# Patient Record
Sex: FEMALE
[De-identification: names, ages, dates, MRNs, and addresses within clinical notes are randomized per-mention and may not be internally consistent; named-entity substitution may affect disease eponyms.]

---
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
## 2022-09-20 ENCOUNTER — OFFICE (OUTPATIENT)
Dept: URBAN - METROPOLITAN AREA CLINIC 6 | Facility: CLINIC | Age: 22
Setting detail: OPHTHALMOLOGY
End: 2022-09-20
Payer: COMMERCIAL

## 2022-09-20 DIAGNOSIS — H43.393: ICD-10-CM

## 2022-09-20 DIAGNOSIS — H52.13: ICD-10-CM

## 2022-09-20 PROBLEM — H52.7 REFRACTIVE ERROR: Status: ACTIVE | Noted: 2022-09-20

## 2022-09-20 PROCEDURE — 92004 COMPRE OPH EXAM NEW PT 1/>: CPT | Performed by: OPHTHALMOLOGY

## 2022-09-20 PROCEDURE — 92015 DETERMINE REFRACTIVE STATE: CPT | Performed by: OPHTHALMOLOGY

## 2022-09-20 ASSESSMENT — REFRACTION_MANIFEST
OS_VA1: 20/20
OS_AXIS: 175
OD_CYLINDER: SPHERE
OD_VA1: 20/20
OS_VA1: 20/20
OS_CYLINDER: -0.75
OD_VA1: 20/20
OS_SPHERE: -1.00
OD_CYLINDER: SPHERE
OD_AXIS: 0
OS_SPHERE: -0.50
OD_SPHERE: -0.75
OD_SPHERE: -1.00
OS_AXIS: 180
OS_CYLINDER: -0.75

## 2022-09-20 ASSESSMENT — REFRACTION_AUTOREFRACTION
OD_SPHERE: -0.75
OD_CYLINDER: SPHERE
OS_CYLINDER: -0.75
OD_CYLINDER: -0.25
OS_AXIS: 180
OD_AXIS: 180
OS_SPHERE: -0.50
OS_SPHERE: -1.00
OS_AXIS: 176
OS_CYLINDER: -0.75
OD_SPHERE: -1.00
OD_AXIS: 0

## 2022-09-20 ASSESSMENT — SPHEQUIV_DERIVED
OD_SPHEQUIV: -0.875
OS_SPHEQUIV: -1.375
OS_SPHEQUIV: -0.875
OS_SPHEQUIV: -0.875
OS_SPHEQUIV: -1.375

## 2022-09-20 ASSESSMENT — AXIALLENGTH_DERIVED
OS_AL: 24.5806
OS_AL: 24.3709
OS_AL: 24.3709
OS_AL: 24.5806
OD_AL: 24.2733

## 2022-09-20 ASSESSMENT — VISUAL ACUITY
OS_BCVA: 20/25+3
OD_BCVA: 20/25-2

## 2022-09-20 ASSESSMENT — KERATOMETRY
OS_AXISANGLE_DEGREES: 093
OD_K2POWER_DIOPTERS: 43.00
OD_K1POWER_DIOPTERS: 42.25
OD_AXISANGLE_DEGREES: 084
OS_K1POWER_DIOPTERS: 42.00
OS_K2POWER_DIOPTERS: 42.75

## 2022-09-20 ASSESSMENT — CONFRONTATIONAL VISUAL FIELD TEST (CVF)
OS_FINDINGS: FULL
OD_FINDINGS: FULL

## 2022-09-20 ASSESSMENT — TONOMETRY
OD_IOP_MMHG: 18
OS_IOP_MMHG: 21

## 2023-03-29 ENCOUNTER — APPOINTMENT (OUTPATIENT)
Dept: OBGYN | Facility: CLINIC | Age: 23
End: 2023-03-29

## 2023-04-03 ENCOUNTER — APPOINTMENT (OUTPATIENT)
Dept: OBGYN | Facility: CLINIC | Age: 23
End: 2023-04-03
Payer: MEDICAID

## 2023-04-03 ENCOUNTER — TRANSCRIPTION ENCOUNTER (OUTPATIENT)
Age: 23
End: 2023-04-03

## 2023-04-03 VITALS
DIASTOLIC BLOOD PRESSURE: 88 MMHG | BODY MASS INDEX: 44.98 KG/M2 | HEIGHT: 65 IN | WEIGHT: 270 LBS | SYSTOLIC BLOOD PRESSURE: 133 MMHG | HEART RATE: 90 BPM

## 2023-04-03 DIAGNOSIS — U07.1 COVID-19: ICD-10-CM

## 2023-04-03 DIAGNOSIS — Z00.00 ENCOUNTER FOR GENERAL ADULT MEDICAL EXAMINATION W/OUT ABNORMAL FINDINGS: ICD-10-CM

## 2023-04-03 DIAGNOSIS — I10 ESSENTIAL (PRIMARY) HYPERTENSION: ICD-10-CM

## 2023-04-03 PROCEDURE — 99385 PREV VISIT NEW AGE 18-39: CPT

## 2023-04-03 NOTE — PHYSICAL EXAM
[Chaperone Present] : A chaperone was present in the examining room during all aspects of the physical examination [Appropriately responsive] : appropriately responsive [Alert] : alert [No Acute Distress] : no acute distress [No Lymphadenopathy] : no lymphadenopathy [Regular Rate Rhythm] : regular rate rhythm [No Murmurs] : no murmurs [Clear to Auscultation B/L] : clear to auscultation bilaterally [Soft] : soft [Non-tender] : non-tender [Non-distended] : non-distended [No HSM] : No HSM [No Lesions] : no lesions [No Mass] : no mass [Oriented x3] : oriented x3 [No Masses] : no breast masses were palpable [Labia Majora] : normal [Labia Minora] : normal [Discharge] : a  ~M vaginal discharge was present [Scant] : scant [Clear] : clear [Thin] : thin [Normal] : normal [Anteversion] : anteverted [Uterine Adnexae] : normal [FreeTextEntry6] : No masses, nontender, no skin changes, no nipple discharge, no adenopathy. [Foul Smelling] : not foul smelling [Tenderness] : nontender [Mass ___ cm] : no uterine mass was palpated [FreeTextEntry5] : No CMT

## 2023-04-03 NOTE — HISTORY OF PRESENT ILLNESS
[FreeTextEntry1] : Patient a 22-year-old  0 last menstrual period \par Patient presents for initial visit, annual, complaining of vaginal discharge in the past 2 to 3 months

## 2023-04-03 NOTE — PLAN
[FreeTextEntry1] : Patient is a 22-year-old  0 last menstrual period 2023\par Patient presents for initial visit complaining of vaginal discharge for the past 2 to 3 months\par Physical exam reveals a well-developed well-nourished female no apparent distress morbidly obese BMI 45\par Heart regular rhythm and rate, lungs clear, breast no mass nontender no skin change or nipple discharge no adenopathy abdomen soft nontender no organomegaly\par Pelvic exam shows normal female external genitalia, vagina no lesions, evidence of a thin scant watery discharge cervix appropriate size nontender no cervical motion tenderness Pap smear performed allowing vaginal cultures\par Uterus anteverted small mobile nontender, adnexa no masses nontender but pelvic exam was very difficult secondary to patient's body habitus\par As stated Pap smear and vaginal cultures performed\par We will await culture results prior to initiating treatment\par Discussed option of possible birth control use Depo-Provera,, since patient is hypertensive\par Discussed weight loss advised as such\par Patient patient stands\par Patient will be contacted with the results of the cultures for further treatment\par \par \par Past medical history,,, hypertension,,COVID\par Past surgical history,,, patient denies\par Allergies,,, patient denies\par Medications,,, labetalol 100 mg twice a day\par Past OB history,,, patient denies\par Past GYN history,,, medication 11, interval 28, duration 07 days, patient denies history of birth control pill use or history of PID or STDs\par Tobacco use,,, patient denies\par Alcohol use,,, social use\par Drug use,,, patient denies\par Family history,,, mother alive hypertension,, father alive with hypertension and diabetes

## 2023-04-06 LAB
A VAGINAE DNA VAG QL NAA+PROBE: NORMAL
BVAB2 DNA VAG QL NAA+PROBE: NORMAL
C KRUSEI DNA VAG QL NAA+PROBE: NEGATIVE
C TRACH RRNA SPEC QL NAA+PROBE: NEGATIVE
C TRACH RRNA SPEC QL NAA+PROBE: NOT DETECTED
MEGA1 DNA VAG QL NAA+PROBE: NORMAL
N GONORRHOEA RRNA SPEC QL NAA+PROBE: NEGATIVE
N GONORRHOEA RRNA SPEC QL NAA+PROBE: NOT DETECTED
SOURCE TP AMPLIFICATION: NORMAL
T VAGINALIS RRNA SPEC QL NAA+PROBE: NEGATIVE

## 2023-11-03 ENCOUNTER — OFFICE (OUTPATIENT)
Dept: URBAN - METROPOLITAN AREA CLINIC 116 | Facility: CLINIC | Age: 23
Setting detail: OPHTHALMOLOGY
End: 2023-11-03
Payer: COMMERCIAL

## 2023-11-03 DIAGNOSIS — H52.222: ICD-10-CM

## 2023-11-03 DIAGNOSIS — H52.12: ICD-10-CM

## 2023-11-03 DIAGNOSIS — H52.11: ICD-10-CM

## 2023-11-03 PROCEDURE — 92014 COMPRE OPH EXAM EST PT 1/>: CPT | Performed by: OPTOMETRIST

## 2023-11-03 PROCEDURE — 92310 CONTACT LENS FITTING OU: CPT | Performed by: OPTOMETRIST

## 2023-11-03 ASSESSMENT — CONFRONTATIONAL VISUAL FIELD TEST (CVF)
OD_FINDINGS: FULL
OS_FINDINGS: FULL

## 2023-12-01 ENCOUNTER — OFFICE (OUTPATIENT)
Dept: URBAN - METROPOLITAN AREA CLINIC 116 | Facility: CLINIC | Age: 23
Setting detail: OPHTHALMOLOGY
End: 2023-12-01

## 2023-12-01 DIAGNOSIS — H52.13: ICD-10-CM

## 2023-12-01 DIAGNOSIS — H52.222: ICD-10-CM

## 2023-12-01 PROCEDURE — N/C NO CHARGE: Performed by: OPTOMETRIST

## 2023-12-01 ASSESSMENT — REFRACTION_AUTOREFRACTION
OS_CYLINDER: -0.75
OD_SPHERE: -0.75
OS_SPHERE: -0.50
OD_SPHERE: PLANO
OD_AXIS: 005
OS_AXIS: 180
OD_CYLINDER: -0.50
OD_CYLINDER: -0.25
OS_CYLINDER: -0.75
OD_SPHERE: -0.75
OD_AXIS: 000
OD_AXIS: 180
OS_SPHERE: -0.50
OS_AXIS: 180
OS_CYLINDER: -0.25
OS_SPHERE: -0.25
OS_SPHERE: -0.75
OS_AXIS: 105
OD_SPHERE: -1.25
OS_CYLINDER: -0.75
OD_CYLINDER: -0.25
OS_AXIS: 180
OD_AXIS: 180
OD_CYLINDER: -0.25

## 2023-12-01 ASSESSMENT — REFRACTION_CURRENTRX
OS_OVR_VA: 20/
OD_CYLINDER: PLANO
OS_CYLINDER: -0.75
OS_SPHERE: -0.50
OS_AXIS: 175
OD_CYLINDER: PLANO
OS_CYLINDER: -0.75
OD_OVR_VA: 20/
OS_AXIS: 175
OS_SPHERE: -0.50
OD_SPHERE: -0.75
OD_SPHERE: -0.75
OS_OVR_VA: 20/
OD_OVR_VA: 20/

## 2023-12-01 ASSESSMENT — AXIALLENGTH_DERIVED
OD_AL: 24.432
OD_AL: 24.2248
OS_AL: 24.4693
OS_AL: 24.6807
OS_AL: 24.5746
OS_AL: 24.4693
OS_AL: 24.5746

## 2023-12-01 ASSESSMENT — KERATOMETRY
OD_K1POWER_DIOPTERS: 42.50
OS_AXISANGLE_DEGREES: 095
OS_K1POWER_DIOPTERS: 41.50
OD_AXISANGLE_DEGREES: 085
OD_K2POWER_DIOPTERS: 43.00
OS_K2POWER_DIOPTERS: 42.75

## 2023-12-01 ASSESSMENT — REFRACTION_MANIFEST
OD_CYLINDER: SPHERE
OD_SPHERE: -0.75
OS_SPHERE: -1.00
OS_SPHERE: -0.50
OS_VA1: 20/20
OD_VA1: 20/20
OD_SPHERE: -1.00
OS_VA1: 20/20
OS_CYLINDER: -0.75
OS_SPHERE: -0.50
OS_AXIS: 175
OD_CYLINDER: SPH
OS_CYLINDER: -0.75
OD_VA1: 20/20
OS_AXIS: 180
OS_CYLINDER: -0.75
OS_CYLINDER: -0.75
OD_CYLINDER: SPHERE
OS_CYLINDER: -0.75
OD_SPHERE: -1.00
OS_SPHERE: -0.75
OS_AXIS: 175
OS_VA1: 20/20
OD_SPHERE: -1.00
OS_AXIS: 180
OS_AXIS: 180
OD_SPHERE: -0.75
OD_AXIS: 0
OD_CYLINDER: SPHERE
OS_VA1: 20/20
OS_SPHERE: -0.75
OS_CYLINDER: -0.75
OD_SPHERE: -1.00
OD_AXIS: 0
OD_CYLINDER: SPHERE
OD_VA1: 20/20
OD_VA1: 20/20
OD_CYLINDER: SPH
OS_SPHERE: -1.00
OS_AXIS: 180

## 2023-12-01 ASSESSMENT — SPHEQUIV_DERIVED
OS_SPHEQUIV: -1.375
OD_SPHEQUIV: -1.375
OS_SPHEQUIV: -1.125
OS_SPHEQUIV: -1.375
OS_SPHEQUIV: -0.375
OS_SPHEQUIV: -0.875
OS_SPHEQUIV: -1.125
OS_SPHEQUIV: -0.875
OS_SPHEQUIV: -0.875
OS_SPHEQUIV: -1.125
OD_SPHEQUIV: -0.875
OD_SPHEQUIV: -0.875
OS_SPHEQUIV: -0.875

## 2023-12-01 ASSESSMENT — CONFRONTATIONAL VISUAL FIELD TEST (CVF)
OD_FINDINGS: FULL
OS_FINDINGS: FULL

## 2025-06-23 PROBLEM — Z11.3 ENCOUNTER FOR SCREENING EXAMINATION FOR SEXUALLY TRANSMITTED INFECTION: Status: ACTIVE | Noted: 2025-06-23

## 2025-06-23 PROBLEM — Z12.4 CERVICAL CANCER SCREENING: Status: ACTIVE | Noted: 2025-06-23

## 2025-06-30 ENCOUNTER — APPOINTMENT (OUTPATIENT)
Dept: OBGYN | Facility: CLINIC | Age: 25
End: 2025-06-30
Payer: COMMERCIAL

## 2025-06-30 ENCOUNTER — LABORATORY RESULT (OUTPATIENT)
Age: 25
End: 2025-06-30

## 2025-06-30 ENCOUNTER — RESULT CHARGE (OUTPATIENT)
Age: 25
End: 2025-06-30

## 2025-06-30 VITALS
SYSTOLIC BLOOD PRESSURE: 133 MMHG | DIASTOLIC BLOOD PRESSURE: 93 MMHG | HEART RATE: 86 BPM | BODY MASS INDEX: 43.82 KG/M2 | WEIGHT: 263 LBS | HEIGHT: 65 IN

## 2025-06-30 PROBLEM — R82.90 ABNORMAL URINE FINDING: Status: ACTIVE | Noted: 2025-06-30

## 2025-06-30 PROBLEM — Z01.419 ENCOUNTER FOR ANNUAL ROUTINE GYNECOLOGICAL EXAMINATION: Status: ACTIVE | Noted: 2025-06-23

## 2025-06-30 LAB
BILIRUB UR QL STRIP: NEGATIVE
CLARITY UR: CLEAR
COLLECTION METHOD: NORMAL
GLUCOSE UR-MCNC: NEGATIVE
HCG UR QL: 0 EU/DL
HEMOGLOBIN: 12.5
HGB UR QL STRIP.AUTO: NORMAL
KETONES UR-MCNC: NEGATIVE
LEUKOCYTE ESTERASE UR QL STRIP: NORMAL
NITRITE UR QL STRIP: NEGATIVE
PH UR STRIP: 6
PROT UR STRIP-MCNC: NORMAL
SP GR UR STRIP: 1

## 2025-06-30 PROCEDURE — 99459 PELVIC EXAMINATION: CPT

## 2025-06-30 PROCEDURE — 81003 URINALYSIS AUTO W/O SCOPE: CPT | Mod: QW

## 2025-06-30 PROCEDURE — 99395 PREV VISIT EST AGE 18-39: CPT

## 2025-06-30 PROCEDURE — 85018 HEMOGLOBIN: CPT | Mod: QW

## 2025-07-01 ENCOUNTER — NON-APPOINTMENT (OUTPATIENT)
Age: 25
End: 2025-07-01

## 2025-07-01 PROBLEM — Z83.3 FAMILY HISTORY OF DIABETES MELLITUS: Status: ACTIVE | Noted: 2025-07-01

## 2025-07-01 PROBLEM — Z82.49 FAMILY HISTORY OF HYPERTENSION: Status: ACTIVE | Noted: 2025-07-01

## 2025-07-01 LAB
C TRACH RRNA SPEC QL NAA+PROBE: NOT DETECTED
N GONORRHOEA RRNA SPEC QL NAA+PROBE: NOT DETECTED
SOURCE AMPLIFICATION: NORMAL

## 2025-07-02 ENCOUNTER — NON-APPOINTMENT (OUTPATIENT)
Age: 25
End: 2025-07-02

## 2025-07-02 ENCOUNTER — TRANSCRIPTION ENCOUNTER (OUTPATIENT)
Age: 25
End: 2025-07-02

## 2025-07-07 LAB — CYTOLOGY CVX/VAG DOC THIN PREP: ABNORMAL

## 2025-08-12 ENCOUNTER — NON-APPOINTMENT (OUTPATIENT)
Age: 25
End: 2025-08-12

## 2025-08-12 ENCOUNTER — APPOINTMENT (OUTPATIENT)
Dept: DERMATOLOGY | Facility: CLINIC | Age: 25
End: 2025-08-12
Payer: COMMERCIAL

## 2025-08-12 DIAGNOSIS — D22.9 MELANOCYTIC NEVI, UNSPECIFIED: ICD-10-CM

## 2025-08-12 DIAGNOSIS — L81.4 OTHER MELANIN HYPERPIGMENTATION: ICD-10-CM

## 2025-08-12 DIAGNOSIS — D48.5 NEOPLASM OF UNCERTAIN BEHAVIOR OF SKIN: ICD-10-CM

## 2025-08-12 PROCEDURE — 14060 TIS TRNFR E/N/E/L 10 SQ CM/<: CPT

## 2025-08-20 LAB — CORE LAB BIOPSY: NORMAL

## 2025-09-09 ENCOUNTER — APPOINTMENT (OUTPATIENT)
Dept: DERMATOLOGY | Facility: CLINIC | Age: 25
End: 2025-09-09
Payer: COMMERCIAL

## 2025-09-09 DIAGNOSIS — L29.9 PRURITUS, UNSPECIFIED: ICD-10-CM

## 2025-09-09 DIAGNOSIS — D22.9 MELANOCYTIC NEVI, UNSPECIFIED: ICD-10-CM

## 2025-09-09 DIAGNOSIS — L91.0 HYPERTROPHIC SCAR: ICD-10-CM

## 2025-09-09 DIAGNOSIS — L81.4 OTHER MELANIN HYPERPIGMENTATION: ICD-10-CM

## 2025-09-09 PROCEDURE — 11900 INJECT SKIN LESIONS </W 7: CPT | Mod: 79

## 2025-09-09 PROCEDURE — 99213 OFFICE O/P EST LOW 20 MIN: CPT | Mod: 24,25
